# Patient Record
Sex: FEMALE | URBAN - METROPOLITAN AREA
[De-identification: names, ages, dates, MRNs, and addresses within clinical notes are randomized per-mention and may not be internally consistent; named-entity substitution may affect disease eponyms.]

---

## 2024-01-22 ENCOUNTER — APPOINTMENT (OUTPATIENT)
Dept: URBAN - METROPOLITAN AREA CLINIC 212 | Age: 64
Setting detail: DERMATOLOGY
End: 2024-01-22

## 2024-01-22 DIAGNOSIS — L98.8 OTHER SPECIFIED DISORDERS OF THE SKIN AND SUBCUTANEOUS TISSUE: ICD-10-CM

## 2024-01-22 DIAGNOSIS — L71.0 PERIORAL DERMATITIS: ICD-10-CM

## 2024-01-22 PROCEDURE — OTHER DAXXIFY: OTHER

## 2024-01-22 PROCEDURE — 99203 OFFICE O/P NEW LOW 30 MIN: CPT

## 2024-01-22 PROCEDURE — OTHER PRESCRIPTION: OTHER

## 2024-01-22 PROCEDURE — OTHER COUNSELING: OTHER

## 2024-01-22 PROCEDURE — OTHER MIPS QUALITY: OTHER

## 2024-01-22 PROCEDURE — OTHER TREATMENT REGIMEN: OTHER

## 2024-01-22 RX ORDER — DOXYCYCLINE HYCLATE 100 MG/1
CAPSULE, GELATIN COATED ORAL
Qty: 30 | Refills: 0 | Status: ERX | COMMUNITY
Start: 2024-01-22

## 2024-01-22 ASSESSMENT — LOCATION DETAILED DESCRIPTION DERM
LOCATION DETAILED: LEFT INFERIOR MEDIAL MALAR CHEEK
LOCATION DETAILED: INFERIOR MID FOREHEAD

## 2024-01-22 ASSESSMENT — LOCATION ZONE DERM: LOCATION ZONE: FACE

## 2024-01-22 ASSESSMENT — LOCATION SIMPLE DESCRIPTION DERM
LOCATION SIMPLE: LEFT CHEEK
LOCATION SIMPLE: INFERIOR FOREHEAD

## 2024-01-22 NOTE — PROCEDURE: DAXXIFY
Additional Area 4 Units: 0
Show Nasal Units: Yes
Reconstitution Date (Optional): 01/22/2024
Show Ucl Units: No
Post-Care Instructions: Patient instructed to not lie down for 4 hours and limit physical activity for 24 hours.
Consent: Written consent obtained. Discussed R/B/SE of Daxxify essentially the same as other neuromodulators and include but not limited to lid/brow ptosis, bruising, swelling, diplopia, temporary effect, and incomplete chemical denervation.\\n\\nReviewed that data for Daxxify duration is based on studies in the glabella only and that 50% of patients had longer lasting effect (5-6 months). Discussed that anecdotally do not expect duration of that length in forehead. Providing special price for first time Daxxify users as we can not guarantee duration. Pt expressed understanding.
Bill Summary Price Listed Below, Or Bill Total Of Units X Price Per Unit?: Bill Summary Price Below
Glabellar Complex Units: 40
Detail Level: Detailed
Show Inventory Tab: Show
Dilution (U/0.1 Cc): 8

## 2024-01-22 NOTE — HPI: RASH
What Type Of Note Output Would You Prefer (Optional)?: Bullet Format
How Severe Is Your Rash?: moderate
Is This A New Presentation, Or A Follow-Up?: Rash
Additional History: Tried essential oils and a moisturizer for face.

## 2024-01-22 NOTE — PROCEDURE: TREATMENT REGIMEN
Plan: 1. Start doxycycline 100 mg by mouth daily with food for 4 weeks\\n2. Minimize facial products and cosmetics until resolved\\n3. Use a gentle cleanser and fragrance-free facial moisturizer (such as Vanicream, Cetaphil, CeraVe)\\n4. Do not use any topical steroids\\n5. Avoid triggers if possible (inhaled steroid medicines, fluorinated toothpaste, etc)\\n6. If sunscreen is needed, use a mineral-based facial sunscreen\\n7. Follow up if worsening or not improving with treatment
Detail Level: Zone